# Patient Record
Sex: MALE | Race: WHITE
[De-identification: names, ages, dates, MRNs, and addresses within clinical notes are randomized per-mention and may not be internally consistent; named-entity substitution may affect disease eponyms.]

---

## 2017-06-08 NOTE — ECHO
Patient:      EVELYN ALARCON 

Med Rec#:     [id number]            :          1975          

Date:         2017            Age:          42y                 

Account#:     [id number]          Height:       175.3 cm / 69.0 in

Accession#:   [id number]           Weight:       97.5 kg / 214.9 lbs

Sex:          M                     BSA:          2.1

Room#:        [facility]                 

Admit Date#:  2017          

Type:         Inpatient

 

Referring:    Robert Perez MD

Reading:      Saran Smith MD

Sonographer:  Veronica Tilley RN RDCS

______________________________________________________________________

 

Transthoracic Echocardiogram

 

Indication:

TIA

BP:           119/78

HR:           65

Rhythm:       NSR

 

Findings     

History:

Former smoker, obesity, anxiety, depression 

 

Technical Comments:

The study quality is fair.  The study is technically limited due to

patient body habitus.  The study is technically limited due to the

patient's smoking history. Completed at 0945. 

 

Left Ventricle:

The left ventricular chamber size is normal. There is increased basal

septal hypertrophy noted without evidence of an increased gradient

across the left ventricular outflow tract.  Mild global hypokinesis of

the left ventricle is observed. There is mildly decreased left

ventricular systolic function. The estimated ejection fraction is

45-50%.  Normal left ventricular diastolic filling is observed. 

 

Left Atrium:

The left atrial chamber size is normal. 

 

Right Ventricle:

The right ventricular cavity size is normal. The right ventricular

global systolic function is low normal. 

 

Right Atrium:

The right atrium is slightly dilated.  The bubble study is negative. A

patent foramen ovale is not demonstrated with color Doppler and agitated

contrast.  

 

Aortic Valve:

The aortic valve is trileaflet. The aortic valve leaflets are mildly

thickened. There is a trace of aortic regurgitation. There is no

evidence of aortic stenosis. 

 

Mitral Valve:

The mitral valve leaflets are mildly thickened. There is a trace of

mitral regurgitation. There is no evidence of mitral stenosis. 

 

Tricuspid Valve:

The tricuspid valve leaflets are normal.  There is trace to mild

tricuspid regurgitation. No pulmonary hypertension is noted. 

 

Pulmonic Valve:

The pulmonic valve appears normal. There is a trace pulmonic

regurgitation.  There is no pulmonic stenosis.  

 

Pericardium:

There is no significant pericardial effusion. A pericardial fat pad is

visualized. 

 

Aorta:

There is no dilatation of the ascending aorta. There is no dilatation of

the aortic arch. There is no dilation of the aortic root. 

 

Pulmonary Artery:

The main pulmonary artery appears normal. 

 

Venous:

The inferior vena cava appears normal in size. There is a greater than

50% respiratory change in the inferior vena cava dimension. 

 

Contrast:

Normal saline was used as contrast for the bubble study.  Images 4-6. 

 

Conclusions

Mild global hypokinesis of the left ventricle is observed.

There is mildly decreased left ventricular systolic function.

The estimated ejection fraction is 45-50%. 

Normal left ventricular diastolic filling is observed.

A patent foramen ovale is not demonstrated with color Doppler and

agitated contrast. 

No significant valvular disease:

There is a trace of aortic regurgitation.

There is a trace of mitral regurgitation.

There is trace to mild tricuspid regurgitation.

No pulmonary hypertension is noted.

There is a trace pulmonic regurgitation. 

No reports of prior studies offered for comparison.

 

Measurements     

Name                    Value         Normal Range            

RVDdMajor (2D)          4.4 cm        (2.2 - 4.4)             

RAd ISD 4CH             4.2 cm        (3.4 - 4.9)             

RA (A4C)W               4.8 cm        (2.9 - 4.6)             

IVSd (2D)               1.2 cm        (0.6 - 1)               

LVPWd (2D)              1 cm          (0.6 - 1)               

LVIDd (2D)              4.3 cm        (3.6 - 5.4)             

LVIDs (2D)              3.2 cm        -                        

LV FS (2D)              26 %          (25 - 45)               

Aortic Annulus          2.4 cm        (1.4 - 2.6)             

Ao root diameter (2D)   3.3 cm        (2.1 - 3.5)             

Ascending Ao            3.1 cm        (2.1 - 3.4)             

LA dimension (AP) 2D    3.7 cm        (2.3 - 3.8)             

LAd ISD 4CH             4.9 cm        (2.9 - 5.3)             

LA ISD 4CH W            4.4 cm        (2.5 - 4.5)             

 

Name                    Value         Normal Range            

LA ESV SP 4CH (A/L)     57 ml         -                        

LA ESV SP 2CH (A/L)     50 ml         -                        

LA ESV BP (A/L)         54 ml         -                        

LA ESV BP (A/L) index   25.5 ml/m2    -                        

LA ESV SP 4CH (MOD)     55 ml         -                        

LA ESV SP 2CH (MOD)     49 ml         -                        

 

Name                    Value         Normal Range            

MV E-wave Vmax          0.72 m/sec    -                        

MV deceleration time    217 msec      -                        

MV A-wave Vmax          0.42 m/sec    -                        

MV E:A ratio            1.7 ratio     -                        

LV septal e' Vmax       0.1 m/sec     -                        

LV lateral e' Vmax      0.12 m/sec    -                        

LV E:e' septal ratio    7.2 ratio     -                        

LV E:e' lateral ratio   6 ratio       -                        

 

Name                    Value         Normal Range            

AV Vmax                 1 m/sec       -                        

AV VTI                  20.6 cm       -                        

AV peak gradient        4 mmHg        -                        

AV mean gradient        2 mmHg        -                        

LVOT Vmax               0.85 m/sec    -                        

LVOT VTI                17.4 cm       -                        

DOYLE Vmax                1 m/sec       -                        

 

Name                    Value         Normal Range            

TR Vmax                 2.3 m/sec     -                        

TR peak gradient        21 mmHg       -                        

RAP                     3 mmHg        -                        

RVSP                    24 mmHg       -                        

IVC diameter            2 cm          -                        

 

Name                    Value         Normal Range            

PV Vmax                 0.88 m/sec    -                        

 

Electronically signed by: Saran Smith MD on 2017 10:08:04

## 2017-06-08 NOTE — RAD
HISTORY: TIA



COMPARISONS: MRI dated June 08, 2017



TECHNIQUE: Multiple contiguous axial CT scans were obtained of the head and neck After the

administration of nonionic intravenous contrast timed to the systemic arterial phase of

contrast enhancement. Coronal and sagittal multiplanar reformations are submitted for

review. Multiple 3-D maximum intensity projection reconstructions are also submitted for

review.    



FINDINGS:



CTA NECK:



AORTIC ARCH: There is a bovine configuration, with the left common carotid artery

originating from the brachiocephalic trunk. There is no ostial or proximal stenosis of the

cephalic great vessels.



RIGHT VERTEBRAL ARTERY: The right vertebral artery is patent along its course, without

stenosis.

LEFT VERTEBRAL ARTERY: The left vertebral artery is patent along its course, without

stenosis.

DOMINANCE: The vertebral arteries are codominant.





RIGHT COMMON CAROTID ARTERY: The right common carotid artery is patent. The right carotid

bifurcation occurs at C3-C4

RIGHT INTERNAL CAROTID ARTERY: There is no right internal carotid artery stenosis by

NASCET criteria. 

RIGHT EXTERNAL CAROTID ARTERY: The right external carotid artery is unremarkable.



LEFT COMMON CAROTID ARTERY: The left common carotid artery is patent. The left carotid

bifurcation occurs at C3-C4

LEFT INTERNAL CAROTID ARTERY: There is no left internal carotid artery stenosis by NASCET

criteria. 

LEFT EXTERNAL CAROTID ARTERY: The left external carotid artery is unremarkable.



VENOUS CIRCULATION: The venous system is unremarkable.



SALIVARY GLANDS: The parotid glands, submandibular glands, sublingual glands are normal.



NASAL CAVITY/NASOPHARYNX: The nasal cavity and nasopharynx are normal.



ORAL CAVITY/OROPHARYNX: The oral cavity is obscured by streak artifact from dental

amalgam. The visualized oral cavity and oropharynx are unremarkable.

LARYNGEAL APPARATUS/HYPOPHARYNX: The laryngeal apparatus and hypopharynx are normal.



UPPER AIRWAY/UPPER ESOPHAGUS: The visualized upper airway and esophagus are normal.

LUNG APICES: The lung apices are clear.



THYROID GLAND: The thyroid gland is mildly enlarged diffusely without focal nodule.



LYMPH NODES: There are scattered small, less than 1 cm short axis, lymph nodes noted along

the anterior and posterior cervical chain. There is no lymphadenopathy by size criteria.



BONES AND SOFT TISSUES: No bone or soft tissue abnormalities are noted.





CTA HEAD:



INTRACRANIAL CIRCULATION: There is no aneurysm, vascular malformation, occlusion, or

stenosis of the visualized intracranial circulation. The anterior communicating artery

complex is clear. Bilateral posterior communicating arteries are identified.



VENOUS CIRCULATION: The venous system is unremarkable.



PERFUSION: There is no obvious parenchymal perfusion deficit.



HEMORRHAGE/INFARCT: There is no hemorrhage or acute infarct.

MASSES/SHIFT: There is no mass or shift.

EXTRA-AXIAL SPACES: There are no extra-axial fluid collections.

SULCI AND VENTRICLES: The sulci and ventricles are normal in size and position for the

patient's stated age.



CEREBRUM: There are no focal parenchymal abnormalities. 

BRAINSTEM: There are no focal parenchymal abnormalities.

CEREBELLUM: There are no focal parenchymal abnormalities.



PARANASAL SINUSES: There is mixed retention cyst of the right maxillary sinus

ORBITS: The orbits are unremarkable.

BONES AND SOFT TISSUE: No bone or soft tissue abnormalities are noted.



OTHER: There is no abnormal enhancement.





IMPRESSION: 

1.  NO INTERNAL CAROTID ARTERY STENOSIS BY NASCET CRITERIA.

2.  NO ANEURYSM, VASCULAR MALFORMATION, OCCLUSION, OR STENOSIS OF THE VISUALIZED

INTRACRANIAL CIRCULATION.

3.  DIFFUSELY ENLARGED THYROID



CPT II Codes: 3100F

## 2017-06-08 NOTE — HP
HISTORY AND PHYSICAL:

 

DATE OF ADMISSION:  06/08/17

 

PRIMARY CARE PROVIDER:  None.

 

CHIEF COMPLAINT:  Trouble speaking.

 

HISTORY OF PRESENT ILLNESS:  The patient is a 42-year-old gentleman who said 
yesterday it started with him feeling like he was blacking out at times at 
work. Then he developed a sharp sudden pain in the center of his chest and 
shortness of breath.  He had no nausea or vomiting.  He did have some 
palpitations.  He was not sweating.  He said the chest pain is worse when he 
takes a deep breath, but it is better now.  He took nothing for it.  However, 
today he said he was talking to his wife at about 5 p.m., and he could not put 
words together.  He also said he had slurred speech, his wife thought he was 
drunk.  He had no facial droop and said he started having difficulty with his 
left side when he was walking because of pain. He still feels like he has leg 
trouble trying to walk.  He still says he has a little trouble speaking.

 

PAST MEDICAL HISTORY:  Bilateral carpal tunnel, plantar fascitis and left knee 
meniscal tear.

 

CURRENT MEDICATIONS:  Only Advil p.r.n..

 

ALLERGIES:  He has allergies/adverse reactions to AMOXICILLIN and PENICILLIN.

 

FAMILY HISTORY:  He is adopted.

 

SOCIAL HISTORY:  No tobacco.  Occasional alcohol.  No recreational drug use.  
He is a .  He is .  He has 2 children, he does not know.  His 
 wife Tatyana Newberry, [phone], is the healthcare proxy.

 

REVIEW OF SYSTEMS:  A 14-point review of systems was completed with the patient
, all pertinent positives and negatives in the history of present illness, 
otherwise negative.

 

                               PHYSICAL EXAMINATION

 

GENERAL:  A pleasant gentleman lying in bed in no acute distress.

 

VITAL SIGNS:  Blood pressure 129/80, pulse ox 98%, respiratory rate 17 breaths 
per minute, heart rate 74 beats per minute, temperature is 98.2 degrees.

 

HEENT:  Normocephalic, atraumatic.  Pupils equal, round, reactive to light.  
Moist mucous membranes.

 

NECK:  Supple.  No JVD, bruits, palpable thyroid, or lymphadenopathy.

 

CHEST:  Clear to auscultation and percussion bilaterally.

 

CARDIOVASCULAR:  S1, S2 appreciated.  Regular rate and rhythm.  No murmurs, 
gallops or rubs.

 

ABDOMEN: Positive bowel sounds in all 4 quadrants.  Soft, nontender, 
nondistended. No hepatosplenomegaly.

 

EXTREMITIES:  No cyanosis, clubbing or edema; +2 peripheral pulses bilaterally.

 

NEUROLOGIC:  Alert and oriented x3.  Moves all extremities.  Good finger-to-
nose. 5/5 motor strength in upper and lower extremities.

 

NEUROLOGIC:  Cranial nerves II to XII grossly intact.

 

SKIN:  No rash or abnormalities.

 

 LABORATORY DATA:  White count 10.8, hemoglobin 15.9, hematocrit 46, platelets 
277,000.  Sodium 142, potassium 3.6, chloride 101, CO2 24, BUN 10, creatinine 
0.70, glucose 98.  TSH 5.60.  Ammonia level 65.  Serum alcohol level is 
negative. 



Brain CT was read as normal exam.  



Chest x-ray showed no acute infiltrates.  



EKG shows normal sinus rhythm at 68 beats a minute, normal axis.  No acute ST-T 
wave changes.

 

ASSESSMENT/PLAN:

1.  Possible transient ischemic attack.  Some of his symptoms are concerning, 
but the pain on the left side does not make much sense to me.  I think it could 
be ______ from fatigue as he says he works 80-hour work week and only gets 4 
hours of sleep at night.  Nevertheless, we will admit the patient, check MRI 
for stroke, TTE with bubble study and CTA of head and neck and ask Neurology to 
see the patient as well.  Neuro checks q.4 hours.  Start baby aspirin a day and 
check lipid profile.

2.  FEN.  Regular diet.

3.  DVT prophylaxis.  Heparin subcu.

4.  The patient is full code.

 

TIME SPENT:  Over 75 minutes was spent on this H and P, more than 40 minutes of 
which were spent in direct face contact with the patient in evaluation, 
physical exam, counseling and coordination of care.

 

[id number]/CPS #: [id number]

JAMAAL

## 2017-06-08 NOTE — RAD
HISTORY: TIA, bilateral extremity weakness



COMPARISONS: Head CT dated June 08, 2017



TECHNIQUE: The following sequences were obtained of the head: Sagittal T1-weighted images,

axial T2-weighted images, axial FLAIR images, axial susceptibility weighted images, axial

T1-weighted images. Additionally, axial diffusion-weighted images were obtained with

calculated apparent diffusion coefficients.    



FINDINGS: 

HEMORRHAGE/INFARCT: There is no hemorrhage or acute infarct.

MASSES/SHIFT: There is no mass or shift.

EXTRA-AXIAL SPACES/MENINGES: There are no extra-axial fluid collections.

SULCI AND VENTRICLES: The sulci and ventricles are normal in size and position for the

patient's stated age.



CEREBRUM: There are no focal parenchymal abnormalities.

BRAINSTEM: There are no focal parenchymal abnormalities.

CEREBELLUM: There are no focal parenchymal abnormalities. The cerebellar tonsils are

normal in size and position.



SELLA: The sella is normal.

PINEAL: The pineal region is clear.

CP ANGLE/TEMPORAL BONES: The labyrinthine structures are grossly normal.



VESSELS: Normal flow-voids are noted within the visualized vertebral vasculature.

DIFFUSION ABNORMALITIES: There are no diffusion abnormalities. 



PARANASAL SINUSES/MASTOIDS: There is a mucous retention cyst of the right maxillary sinus.

ORBITS: The orbits are unremarkable.

BONES AND SOFT TISSUE: No bone or soft tissue abnormalities are noted.



OTHER: None



IMPRESSION: 

UNREMARKABLE MRI OF THE BRAIN. THERE IS NO RESTRICTED DIFFUSION TO SUGGEST ACUTE INFARCT.

## 2017-06-08 NOTE — RAD
INDICATION:  Left arm and leg numbness.



COMPARISON:  There are no prior studies available for comparison.



TECHNIQUE: Contiguous axial sections of the brain were obtained from the skull base to the

vertex without contrast.



FINDINGS: The ventricles, cisterns and sulci are within normal limits.  No significant

focal abnormality or mass effect is seen. There is no evidence for hemorrhage.



No significant focal osseous abnormality is seen. The visualized portion of the paranasal

sinuses and mastoid air cells appear clear.



IMPRESSION:  NO EVIDENCE FOR GROSS ACUTE INFARCT, MASS EFFECT OR HEMORRHAGE.

## 2017-06-08 NOTE — ED
Brandy CATHERINE Salem, scribed for Lexx Desai MD on 06/07/17 at 2353 .





Complex/Multi-Sys Presentation





- HPI Summary


HPI Summary: 





Patient is a 41 y/o M who presents to the ED with left-sided numbness and 

general weakness since 1600 today. He reports left-sided pain, dark cloudy urine

, blurred vision, slurred speech, SOB, dizziness, lower back pain, chills, CP, 

and a headache that is now resolved. Pt denies neck pain, except for what is 

with the left-sided pain. Pt takes 2-3 Advils most days. He also states that 

he recently lost about 30lbs on purpose. He reports recent stress and working 

often, as well as drinking a lot of Mountain Dew.





- History Of Current Complaint


Chief Complaint: EDGeneral


Hx Obtained From: Patient


Onset/Duration: Gradual Onset, Lasting Hours, Still Present


Timing: Constant


Severity Currently: Moderate


Severity Initially: Moderate


Location: Pain At: - Left-side. Lower back.


Aggravating Factor(s): Nothing.


Alleviating Factor(s): Nothing.


Associated Signs And Symptoms: Positive: Dizziness, Weakness, Headache, SOB, 

Chest Pain, Back Pain





- Allergies/Home Medications


Allergies/Adverse Reactions: 


 Allergies











Allergy/AdvReac Type Severity Reaction Status Date / Time


 


Amoxicillin Allergy  Rash Verified 06/07/17 22:53


 


Penicillins Allergy  Rash Verified 06/07/17 22:53











Home Medications: 


 Home Medications





Ibuprofen [Ibuprofen 200 MG] 600 mg PO Q6HR PRN 06/08/17 [History Confirmed 06/ 08/17]











PMH/Surg Hx/FS Hx/Imm Hx


Previously Healthy: Yes





- Surgical History


Surgery Procedure, Year, and Place: None.


Infectious Disease History: Yes


Infectious Disease History: 


   Denies: Traveled Outside the [place] in Last 30 Days





- Family History


Known Family History: Positive: Other - Unknown as pt was adopted. 





- Social History


Alcohol Use: Occasionally


Hx Substance Use: No


Substance Use Type: Reports: None


Hx Tobacco Use: No


Smoking Status (MU): Never Smoked Tobacco





Review of Systems


Positive: Chills


Positive: Blurred Vision


Positive: Chest Pain


Positive: Shortness Of Breath


Positive: other - Dark cloudy urine. 


Positive: Other - Left-sided pain and numbness. Lower back pain. 


Neurological: Other - Dizziness. 


Positive: Headache, Weakness, Numbness, Syncope - Slurred speech.


All Other Systems Reviewed And Are Negative: Yes





Physical Exam


Triage Information Reviewed: Yes


Vital Signs On Initial Exam: 


 Initial Vitals











Temp Pulse Resp BP Pulse Ox


 


 97.3 F   93   20   126/78   100 


 


 06/07/17 22:35  06/07/17 22:35  06/07/17 22:35  06/07/17 22:35  06/07/17 22:35











Vital Signs Reviewed: Yes


Appearance: Positive: Well-Appearing, No Pain Distress, Obese


Skin: Positive: Warm, Skin Color Reflects Adequate Perfusion, Dry


Head/Face: Positive: Normal Head/Face Inspection


Eyes: Positive: EOMI, ERLINDA


Neck: Positive: Supple, Nontender


Respiratory/Lung Sounds: Positive: Clear to Auscultation, Breath Sounds Present


Cardiovascular: Positive: RRR


Abdomen Description: Positive: Nontender, Soft


Bowel Sounds: Positive: Present


Musculoskeletal: Positive: Normal, Strength/ROM Intact


Neurological: Positive: Normal, Sensory/Motor Intact, Alert, Oriented to Person 

Place, Time


Psychiatric: Positive: Anxious





- Garden Coma Scale


Best Eye Response: 4 - Spontaneous


Best Motor Response: 6 - Obeys Commands


Best Verbal Response: 5 - Oriented


Coma Scale Total: 15





Diagnostics





- Vital Signs


 Vital Signs











  Temp Pulse Resp BP Pulse Ox


 


 06/07/17 22:54   87  17   97


 


 06/07/17 22:52  98.3 F  89  14  129/88  98


 


 06/07/17 22:35  97.3 F  93  20  126/78  100














- Laboratory


Lab Results: 


 Lab Results











  06/08/17 06/08/17 06/08/17 Range/Units





  00:40 00:40 00:40 


 


WBC  10.8    (3.5-10.8)  10^3/ul


 


RBC  5.42 H    (4.0-5.4)  10^6/ul


 


Hgb  15.9    (14.0-18.0)  g/dl


 


Hct  46    (42-52)  %


 


MCV  85    (80-94)  fL


 


MCH  29    (27-31)  pg


 


MCHC  34    (31-36)  g/dl


 


RDW  13    (10.5-15)  %


 


Plt Count  277    (150-450)  10^3/ul


 


MPV  9    (7.4-10.4)  um3


 


Neut % (Auto)  56.6    (38-83)  %


 


Lymph % (Auto)  36.1    (25-47)  %


 


Mono % (Auto)  5.6    (1-9)  %


 


Eos % (Auto)  1.3    (0-6)  %


 


Baso % (Auto)  0.4    (0-2)  %


 


Absolute Neuts (auto)  6.1    (1.5-7.7)  10^3/ul


 


Absolute Lymphs (auto)  3.9    (1.0-4.8)  10^3/ul


 


Absolute Monos (auto)  0.6    (0-0.8)  10^3/ul


 


Absolute Eos (auto)  0.1    (0-0.6)  10^3/ul


 


Absolute Basos (auto)  0    (0-0.2)  10^3/ul


 


Absolute Nucleated RBC  0.01    10^3/ul


 


Nucleated RBC %  0.1    


 


INR (Anticoag Therapy)   0.87 L   (0.89-1.11)  


 


APTT   38.3 H   (26.0-36.3)  seconds


 


D-Dimer, Quantitative   < 200   (Less Than 230)  ng/mL


 


Sodium    142  (133-145)  mmol/L


 


Potassium    3.6  (3.5-5.0)  mmol/L


 


Chloride    101  (101-111)  mmol/L


 


Carbon Dioxide    24  (22-32)  mmol/L


 


Anion Gap    17 H  (2-11)  mmol/L


 


BUN    10  (6-24)  mg/dL


 


Creatinine    0.70  (0.67-1.17)  mg/dL


 


Est GFR ( Amer)    159.0  (>60)  


 


Est GFR (Non-Af Amer)    123.7  (>60)  


 


BUN/Creatinine Ratio    14.3  (8-20)  


 


Glucose    98  ()  mg/dL


 


Lactic Acid     (0.5-2.0)  mmol/L


 


Calcium    9.6  (8.6-10.3)  mg/dL


 


Magnesium    2.4  (1.9-2.7)  mg/dL


 


Total Bilirubin    2.00 H  (0.2-1.0)  mg/dL


 


AST    18  (13-39)  U/L


 


ALT    18  (7-52)  U/L


 


Alkaline Phosphatase    73  ()  U/L


 


Ammonia     (16-53)  mol/L


 


Total Creatine Kinase    106  ()  U/L


 


CK-MB (CK-2)    2.6  (0.6-6.3)  ng/mL


 


Troponin I    0.01  (<=.08)  ng/mL


 


C-Reactive Protein    1.16  (< 5.00)  mg/L


 


B-Natriuretic Peptide    ( - 100) pg/mL


 


Total Protein    7.5  (6.4-8.9)  g/dL


 


Albumin    4.6  (3.2-5.2)  g/dL


 


Globulin    2.9  (2-4)  g/dL


 


Albumin/Globulin Ratio    1.6  (1-3)  


 


Lipase    18  (11.0-82.0)  U/L


 


TSH    5.61 H  (0.34-5.60)  mcIU/mL


 


Acetaminophen    < 15  mcg/mL


 


Serum Alcohol    < 10  (<10)  mg/dL














  06/08/17 06/08/17 Range/Units





  00:40 00:40 


 


WBC    (3.5-10.8)  10^3/ul


 


RBC    (4.0-5.4)  10^6/ul


 


Hgb    (14.0-18.0)  g/dl


 


Hct    (42-52)  %


 


MCV    (80-94)  fL


 


MCH    (27-31)  pg


 


MCHC    (31-36)  g/dl


 


RDW    (10.5-15)  %


 


Plt Count    (150-450)  10^3/ul


 


MPV    (7.4-10.4)  um3


 


Neut % (Auto)    (38-83)  %


 


Lymph % (Auto)    (25-47)  %


 


Mono % (Auto)    (1-9)  %


 


Eos % (Auto)    (0-6)  %


 


Baso % (Auto)    (0-2)  %


 


Absolute Neuts (auto)    (1.5-7.7)  10^3/ul


 


Absolute Lymphs (auto)    (1.0-4.8)  10^3/ul


 


Absolute Monos (auto)    (0-0.8)  10^3/ul


 


Absolute Eos (auto)    (0-0.6)  10^3/ul


 


Absolute Basos (auto)    (0-0.2)  10^3/ul


 


Absolute Nucleated RBC    10^3/ul


 


Nucleated RBC %    


 


INR (Anticoag Therapy)    (0.89-1.11)  


 


APTT    (26.0-36.3)  seconds


 


D-Dimer, Quantitative    (Less Than 230)  ng/mL


 


Sodium    (133-145)  mmol/L


 


Potassium    (3.5-5.0)  mmol/L


 


Chloride    (101-111)  mmol/L


 


Carbon Dioxide    (22-32)  mmol/L


 


Anion Gap    (2-11)  mmol/L


 


BUN    (6-24)  mg/dL


 


Creatinine    (0.67-1.17)  mg/dL


 


Est GFR ( Amer)    (>60)  


 


Est GFR (Non-Af Amer)    (>60)  


 


BUN/Creatinine Ratio    (8-20)  


 


Glucose    ()  mg/dL


 


Lactic Acid   0.9  (0.5-2.0)  mmol/L


 


Calcium    (8.6-10.3)  mg/dL


 


Magnesium    (1.9-2.7)  mg/dL


 


Total Bilirubin    (0.2-1.0)  mg/dL


 


AST    (13-39)  U/L


 


ALT    (7-52)  U/L


 


Alkaline Phosphatase    ()  U/L


 


Ammonia  65 H   (16-53)  mol/L


 


Total Creatine Kinase    ()  U/L


 


CK-MB (CK-2)    (0.6-6.3)  ng/mL


 


Troponin I    (<=.08)  ng/mL


 


C-Reactive Protein    (< 5.00)  mg/L


 


B-Natriuretic Peptide  12  ( - 100) pg/mL


 


Total Protein    (6.4-8.9)  g/dL


 


Albumin    (3.2-5.2)  g/dL


 


Globulin    (2-4)  g/dL


 


Albumin/Globulin Ratio    (1-3)  


 


Lipase    (11.0-82.0)  U/L


 


TSH    (0.34-5.60)  mcIU/mL


 


Acetaminophen    mcg/mL


 


Serum Alcohol    (<10)  mg/dL











Result Diagrams: 


 06/08/17 00:40





 06/08/17 00:40


Lab Statement: Any lab studies that have been ordered have been reviewed, and 

results considered in the medical decision making process.





- Radiology


  ** CXR


Radiology Interpretation Completed By: ED Physician - Normal.





- CT


  ** BRAIN


CT Interpretation Completed By: Radiologist - Impression: normal exam





- EKG


  ** 0101


EKG Interpretation: NSR @ 68 bpm. Early repolarization seen on 5/25/25 EKG. No 

ectopy. 





National Institutes Of Health





- NIH Scale


Level of Consciousness: Alert/Keenly Responsive


Ask Patient the Month and His/Her Age: Both Correct


Ask Pt to Open/Close Eyes and /Release Non-Paretic Hand: Both Correctly


Best Gaze (Only Horizontal Eye Movement): Normal


Visual Field Testing: No Visual Loss


Facial Paresis-Pt to Smile & Close Eyes or Grimace Symmetry: Normal/Symmetrical


Motor Function - Right Arm: No Drift-Holds 10 Seconds


Motor Function - Left Arm: No Drift-Holds 10 Seconds


Motor Function - Right Leg: No Drift-Holds 10 Seconds


Motor Function - Left Leg: No Drift-Holds 10 Seconds


Limb Ataxia-Must be out of Proportion to Weakness Present: Absent


Sensory (Use Pinprick to Test Arms/Legs/Trunk/Face): Normal - Pt reports 

decreased sensation to LUE and LLE. However, no weakness found on examination.


Best Language (Describe Picture, Name Items): No Aphasia


Dysarthria (Read Several Words): Normal


Extinction and Inattention: No Abnormality


Total Score: 0





Re-Evaluation





- Re-Evaluation


  ** First Eval


Re-Evaluation Time: 03:07


Comment: Discussed imaging results and plans to admit.





Complex Multi-Symp Course/Dx


Course Of Treatment: NO CRITICAL CARE TIME.  DISCUSSED RESULTS WITH PATIENT.  

ADMIT HOSPITALIST STABLE.





- Diagnoses


Provider Diagnoses: 


 TIA (transient ischemic attack), Chest pain, Hyperammonemia








- Physician Notifications


Discussed Care Of Patient With: Robert Perez


Time Discussed With Above Provider: 03:15


Instructed by Provider To: Admit As Inpatient


Admit/Transition Orders Completed By ED Provider: Yes





Discharge





- Discharge Plan


Condition: Stable


Disposition: ADMITTED TO [facility] documentation as recorded by the Brandy oakley Salem accurately reflects 

the service I personally performed and the decisions made by me, Lexx Desai MD.

## 2017-06-08 NOTE — RAD
INDICATION:  Chest pain.



COMPARISON:  Comparison is made with a prior study from May 25, 2015.



TECHNIQUE: A portable view of the chest was obtained.



FINDINGS: Cardiac and mediastinal contours appear to be within normal limits.



The lungs are clear. No pleural effusion is seen.



IMPRESSION:  NO EVIDENCE FOR ACUTE DISEASE.

## 2017-06-08 NOTE — CONS
NEUROLOGY CONSULTATION:

 

DATE OF CONSULT:  06/08/17

 

REFERRING PHYSICIAN:  Dr. Melendez.

 

LOCATION:  He is an inpatient, room 446.

 

CHIEF COMPLAINT:  Headache, difficulty with speech, numbness.

 

HISTORY OF PRESENT ILLNESS:  Arron Newberyr is a 42-year-old right-handed man who 
was in his usual state of health yesterday at about 4:30 at his second job.  He 
started to get a headache and just feel generally unwell.  He felt somewhat 
dizzy and lightheaded.  He simultaneously noticed a sense of numbness down the 
left side of his body including face and arm and, to a lesser extent, the leg.  
He felt a little bit nauseous.  He continued to work, but felt more and more 
ill and developed some photophobia.  He noticed some midsternal chest pain.  He 
became concerned and went out to his van and sat in it for a while until his 
 came 2 hours later.  He spoke with his wife on the phone while 
he was waiting and he had difficulty coming up with words.  She is present this 
afternoon and said that he did not feel well and had a headache and chest pain 
and she told him to go to the emergency room.  His speech was clear, but she 
said at times he sounded a bit mumbly.  After about 2 hours, his business 
partner showed up and he drove himself to the emergency room, presented, and 
was hospitalized.

 

He gets retrosternal chest pain fairly often.  He attributes it to his hiatal 
hernia which he was diagnosed with.  Normally, he does not seek medical 
attention for it.  He had an endoscopy a couple of years back and told he had a 
hiatal hernia.

 

PAST MEDICAL HISTORY:  He has a history of migraine headaches going back to his 
childhood.  They used to be more severe.  He would have to lie down in a dark 
room and lie completely still.  He would have nausea and photophobia.  Over the 
years, they have gotten better.  If he does not have his daily fix of caffeine; 
however, he will get a terrible headache.

 

MEDICATIONS:  He is not on any medications at home.

 

He has received aspirin 81 mg here as well as subcutaneous heparin 5000 units 
every 8 hours.

 

ALLERGIES:  He is allergic to PENICILLIN which caused a rash.

 

FAMILY HISTORY:  He is adopted.  He does know that his mother was an alcoholic.

 

REVIEW OF SYSTEMS:  Notable for attention deficit disorder diagnosed when he 
was young.  He says he thinks he has fetal alcohol syndrome as his mother drank 
heavily through the pregnancy with him.  There is no history of seizures or 
episodes of loss of consciousness.  There is no history of diabetes, 
hypertension, or heart disease.  He works 2 jobs upwards of 16 ______ hours a 
day.  He sleeps at most 4 to 5 hours at a time.  He drinks 2 bottles of 
Mountain Dew and 1 coffee per day.  No other caffeine intake.

 

PHYSICAL EXAMINATION:  He is well developed and well nourished.  He has been 
afebrile throughout his hospital stay.  Blood pressure is running about 140/80, 
heart rate 80 and regular, and respiratory rate 16.  Oxygen saturation 98% on 
room air.  Heart is in a regular rate and rhythm without murmurs.  Carotid 
pulses are present.  There are no bruits.  Lungs are clear bilaterally.  Head 
is atraumatic. Oral mucosa is moist and atraumatic.

 

Neurological Exam:  Pupils react equally from 5 to 2.5 mm.  Funduscopic exam is 
normal bilaterally.  Visual fields are full to confrontation.  There is no 
ptosis. Facial musculature and facial sensation are intact and symmetric. 
Palate and tongue appear normal and speech is clear.  Hearing is intact 
bilaterally and neck strength is normal.

 

Motor exam reveals normal muscle tone and strength proximally and distally in 
upper and lower extremities.  There is no pronator drift.

 

There is a mild high frequency sustention tremor in the hands.  There is no 
rest tremor.  Finger taps and finger-to-nose maneuver are normal bilaterally.  
Heel-to- shin maneuver is normal bilaterally.

 

Reflexes are brisk and symmetric and plantar responses are flexor.  Gait is 
normal. Romberg sign is absent.

 

He is alert and oriented to person, place, and time.  He is a good detailed 
historian with intact memory and fluent language.  He has good attention, 
concentration, and adequate fund of knowledge.

 

DIAGNOSTIC STUDIES/LAB DATA:  Includes a normal CBC, chemistry profile, and a 
borderline low INR at 0.87.  Serum alcohol was less than 10.

 

MRI of the brain was reviewed and interpreted as normal.  I reviewed it and I 
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  
There is some thinning of the posterior third of the corpus callosum as well.

 

IMPRESSION:  Probable migraine.  He certainly describes migraine headaches 
going back to childhood and this episode was accompanied by a bad headache with 
some left hemisensory deficits and word finding problems.

 

He is a heavy caffeine user and is probably self-treating both his headaches 
and his attention deficit disorder with caffeine.  I talked about preventative 
medicines for migraines if his headaches worsen.  Currently, he thinks that 
they are infrequent and that he controls them adequately.  I will discuss my 
impression with Dr. Melendez.

 

 [id number] #: [id number]

JAMAAL

## 2017-06-11 NOTE — DS
DISCHARGE SUMMARY:

 

DATE OF ADMISSION:  06/08/17

 

DATE OF DISCHARGE:  06/08/17

 

PRIMARY CARE PROVIDER:  None.

 

PRINCIPAL DISCHARGE DIAGNOSIS:  Complex migraine with left-sided weakness 
initially suggesting stroke, but normal MRI and clinical features more 
consistent with migraine.

 

SECONDARY DIAGNOSES:

1.  Anxiety.

2.  Significant job stress.

3.  Bilateral carpal tunnel.

4.  Plantar fasciitis.

5.  Left knee meniscal tear.

 

DISCHARGE MEDICATIONS:  Advil, OTC, p.r.n.

 

HISTORY OF PRESENT ILLNESS:  Mr. Newberry is a 42-year-old gentleman who reported 
feeling like he was blacking out at work.  He developed a sharp sudden pain in 
the center of his chest and shortness of breath and had some palpitations.  The 
patient denied any facial droop, but did have some slurred speech and his wife 
thought he was drunk.  The patient had some leg trouble and trying to walk and 
had some trouble formulating words.  He had a headache during this time.  He 
was seen in the hospital and stat brain CT was read as normal.  Chest x-ray 
showed no infiltrates and an EKG showed normal sinus rhythm without any ST or T-
wave changes. His white blood cell count  was not elevated.  His chemistries 
were generally unremarkable. His serum alcohol was negative.  His ammonia was 
only modestly elevated.  The patient was placed on observation status and an 
MRI was checked for stroke and an echocardiogram was also ordered.  The MRI was 
effectively normal and his symptoms spontaneously resolved.   He also had a 
head CTA during the hospitalization which showed no internal carotid artery 
stenosis and no aneurysm, vascular malformation, occlusion, or stenosis of the 
visualized intracranial circulation but did have diffusely enlarged thyroid. On 
further questioning, the patient's concurrent headache and the constellations 
of symptoms is consistent with complex migraine and the patient was discharged 
in stable condition and without any active symptomatology.  



The patient is declining a referral to PCP at this time as he is working on 
getting insurance and I gave him the physician referral line to follow up when 
he does get insurance.  I told him to come back to the emergency room if has 
any worrisome symptoms including, but not limited to, chest pain, 
lightheadedness, focal neurologic deficits, or any other worrisome symptoms 
that might arise. His TSH was modestly elevated only 1/100th of a point above 
the normal range.  This should be followed into the future, but insufficient 
justification at this time, considering the presentation, to initiate thyroid 
replacement as he has not established with the PCP to follow up.  The patient 
was counseled about the need to set up a PCP relationship in the next 1 to 2 
months and then have his records forwarded to that individual and he said we 
will comply with that instruction.

 

TIME SPENT:  Total time taken to discharge Mr. Newberry was 45 minutes, greater 
than half the time was spent going over the discharge instructions and the 
results of the hospitalization as described above.

 

CONDITION:  Stable.

 

 [id number] #: [id number]

JAMAAL

## 2019-02-22 ENCOUNTER — HOSPITAL ENCOUNTER (EMERGENCY)
Dept: HOSPITAL 25 - ED | Age: 44
Discharge: HOME | End: 2019-02-22
Payer: COMMERCIAL

## 2019-02-22 VITALS — DIASTOLIC BLOOD PRESSURE: 77 MMHG | SYSTOLIC BLOOD PRESSURE: 122 MMHG

## 2019-02-22 DIAGNOSIS — F90.9: ICD-10-CM

## 2019-02-22 DIAGNOSIS — R10.84: ICD-10-CM

## 2019-02-22 DIAGNOSIS — J18.9: Primary | ICD-10-CM

## 2019-02-22 DIAGNOSIS — Z88.0: ICD-10-CM

## 2019-02-22 DIAGNOSIS — Z86.73: ICD-10-CM

## 2019-02-22 DIAGNOSIS — M54.9: ICD-10-CM

## 2019-02-22 PROCEDURE — 71046 X-RAY EXAM CHEST 2 VIEWS: CPT

## 2019-02-22 PROCEDURE — 99283 EMERGENCY DEPT VISIT LOW MDM: CPT

## 2019-02-22 NOTE — ED
Back Pain





- HPI Summary


HPI Summary: 


Pt is 42 y/o M who presents to ED c/o right sided back pain since this morning. 

Rates his pain 9/10 in severity. Moving and touch exacerbates the pain. Advill 

alleviated the pain for a few hours. He has been intermittently sick for a 

couple weeks with burning sinus pressure. Denies cough. 





- History of Current Complaint


Chief Complaint: EDShortnessOfBreath


Stated Complaint: COUGH/RIGHT FLANK PAIN/2WKS/ACHES


Time Seen by Provider: 02/22/19 16:14


Hx Obtained From: Patient


Onset/Duration: Lasting Hours, Still Present


Onset/Duration: Started Hours Ago, Still Present


Back Pain Location: Is Discrete @ - right side


Severity Currently: Severe


Pain Intensity: 9


Pain Scale Used: 0-10 Numeric


Character: Aching


Aggravating Symptom(s): Movement, Other - touch


Alleviating Symptom(s): Other - advil


Associated Signs And Symptoms: Positive: Flank Pain





- Allergies/Home Medications


Allergies/Adverse Reactions: 


 Allergies











Allergy/AdvReac Type Severity Reaction Status Date / Time


 


Penicillins Allergy  Rash Verified 02/22/19 13:36














PMH/Surg Hx/FS Hx/Imm Hx


Endocrine/Hematology History: Reports: Other Endocrine/Hematological Disorders 

- elevated bilirubin


Cardiovascular History: Reports: Other Cardiovascular Problems/Disorders - Afib


   Denies: Hx Pacemaker/ICD


GI History: Reports: Hx Hiatal Hernia


Musculoskeletal History: Reports: Other Musculoskeletal History - carpal tunnel 

bilaterally


Sensory History: Reports: Hx Contacts or Glasses - glasses


   Denies: Hx Hearing Aid


Opthamlomology History: Reports: Hx Contacts or Glasses - glasses


Neurological History: Reports: Hx Transient Ischemic Attacks (TIA) - newly 

diagnosed


Psychiatric History: Reports: Hx Attention Deficit Hyperactivity Disorder, Hx 

Depression, Hx Panic Disorder





- Surgical History


Surgery Procedure, Year, and Place: appy


Infectious Disease History: No


Infectious Disease History: 


   Denies: Traveled Outside the [place] in Last 30 Days





- Family History


Known Family History: Positive: Other - Unknown as pt was adopted. 





- Social History


Alcohol Use: Occasionally


Hx Substance Use: No


Substance Use Type: Reports: None


Hx Tobacco Use: No


Smoking Status (MU): Never Smoked Tobacco





Review of Systems


Positive: Other - sinus pressure


Negative: Cough


Positive: Other - back pain 


All Other Systems Reviewed And Are Negative: Yes





Physical Exam





- Summary


Physical Exam Summary: 


Appearance: The patient is well-nourished in no acute distress and in no acute 

pain.


 


Skin: The skin is warm and dry and skin color reflects adequate perfusion.





HEENT: The head is normocephalic and atraumatic. The pupils are equal and 

reactive. The conjunctivae are clear and without drainage. Nares are patent and 

without drainage. Mouth reveals moist mucous membranes and the throat is 

without erythema and exudate. The external ears are intact. The ear canals are 

patent and without drainage. The tympanic membranes are intact.


 


Neck: The neck is supple with full range of motion and non-tender. There are no 

carotid bruits. There is no neck vein distension.


 





Respiratory: Chest is non-tender. Lungs are clear to auscultation and breath 

sounds are symmetrical and equal.


 


Cardiovascular: Heart is regular rate and rhythm. There is no murmur or rub 

auscultated. There is no peripheral edema and pulses are symmetrical and equal.


 


Abdomen: The abdomen is soft and non-tender. There are normal bowel sounds 

heard in all four quadrants and there is no organomegaly palpated.


 


Musculoskeletal: Tenderness in right paradorsal area. Extremities are non-

tender with full range of motion. There is good capillary refill. There is no 

peripheral edema or calf tenderness elicited.


 


Neurological: Patient is alert and oriented to person, place and time. The 

patient has symmetrical motor strength in all four extremities. Cranial nerves 

are grossly intact. Deep tendon reflexes are symmetrical and equal in all four 

extremities.


 


Psychiatric: The patient has an appropriate affect and does not exhibit any 

anxiety or depression.





Triage Information Reviewed: Yes


Vital Signs On Initial Exam: 


 Initial Vitals











Temp Pulse Resp BP Pulse Ox


 


 98.3 F   85   18   147/92   98 


 


 02/22/19 13:32  02/22/19 13:32  02/22/19 13:32  02/22/19 13:32  02/22/19 13:32











Vital Signs Reviewed: Yes





Diagnostics





- Vital Signs


 Vital Signs











  Temp Pulse Resp BP Pulse Ox


 


 02/22/19 14:55  98.6 F  93  18  0/0  99


 


 02/22/19 13:32  98.3 F  85  18  147/92  98














- Laboratory


Lab Statement: Any lab studies that have been ordered have been reviewed, and 

results considered in the medical decision making process.





- Radiology


  ** CXR


Radiology Interpretation Completed By: Radiologist


Summary of Radiographic Findings: IMPRESSION: Mild RIGHT basilar airspace 

consolidation suspicious for pneumonia.  ED Physician reviewed this report.





Back Pain Course/Dx





- Course


Course Of Treatment: Mr. Newberry presented complaining of URI symptoms for a few 

weeks and now developing right-sided chest pain which is pleuritic and also 

exacerbated by movement.  He is not short of breath but does have a paroxysmal 

cough.  He was nontoxic in appearance with stable vital signs.  Chest x-ray was 

obtained which was read as early pneumonia by the radiologist.  I will start 

him on Biaxin and Robitussin with codeine.  I encouraged follow-up with his PCP.





- Diagnoses


Provider Diagnoses: 


 Pneumonia








Discharge





- Sign-Out/Discharge


Documenting (check all that apply): Patient Departure - Discharge


Patient Received Moderate/Deep Sedation with Procedure: No





- Discharge Plan


Condition: Stable


Disposition: HOME


Prescriptions: 


Clarithromycin TAB* [Biaxin TAB*] 500 mg PO BID #20 tab


Codeine Phosphate/Guaifenesin [Guaiatussin AC Liquid] 5 ml PO Q4HR #120 ml MDD 

30 cc


Patient Education Materials:  Pneumonia (ED)


Referrals: 


Antonieta Hernández MD [Primary Care Provider] - 3 Days


Additional Instructions: 


Follow up with your primary care provider. Return to ED for any new or 

worsening symptoms. 





- Billing Disposition and Condition


Condition: STABLE


Disposition: Home





- Attestation Statements


Document Initiated by Scribe: Yes


Documenting Scribe: Molly Coronado


Provider For Whom Pascual is Documenting (Include Credential): Dr. Hever Hull MD


Scribe Attestation: 


IMolly, scribed for Dr. Hever Hull MD on 02/22/19 at 2125. 


Scribe Documentation Reviewed: Yes


Provider Attestation: 


The documentation as recorded by the Molly oakley accurately 

reflects the service I personally performed and the decisions made by me, Dr. Hever Hull MD


Status of Scribe Document: Viewed